# Patient Record
(demographics unavailable — no encounter records)

---

## 2025-01-15 NOTE — HISTORY OF PRESENT ILLNESS
[FreeTextEntry1] :     47yo  with Mirena IUD (17 insertion at postpartum visit) here for annual Gyn exam as well as removal/replacement of IUD. Had had monthly light periods for several years after new Mirena but has been mostly  amenorrheic over past year. Last "period" was in August. Pt has c/o irritable bladder- feels that she has to urinate all the time but bladder is not full. Has no pain or burning with urination. Has h/o ASCUS/+HPV Pap in 2019 but last Pap 8/10/23 was ASCUS/HPV Neg. Is currently taking Doxycycline and Flagyl for H. Pylori. Her HgA1C is 6 and she has been diagnosed with fatty liver as well.  OB History Total pregnancies 7. Total living children 3. Miscarriage(s) 3. (s) 1. Pregnancy 1: , TEP. Pregnancy 2: 2007, 13W, spontaneous , dilatation and curettage (D&C). Pregnancy 3 10/2007, 7W, spontaneous , dilatation and curettage (D&C). Pregnancy 4: 2008, 7W, spontaneous , dilatation and curettage (D&C). Pregnancy 5: 2009, normal spontaneous vaginal delivery (), 27W, 2lb 2oz, Female "Ana", Delivered @ [facility], Dr. Bradley . Pregnancy 6 2010, normal spontaneous vaginal delivery, FT, female "Nirali". Pregnancy 7 2017, normal spontaneous vaginal delivery (), 40W, Male "Everardo", .  [Mammogramdate] : 12/27/24 BIRADS 0,1/15/23,  [TextBox_19] : BIRADS 0 -> BIRADS 2 on diagnostic [BreastSonogramDate] : 12/27/24, 1/15/25 [TextBox_25] : BIRADS 2 [PapSmeardate] : 8/10/23 [TextBox_31] : ASCUS/HPV Neg +BV [ColonoscopyDate] : 12/2024 [TextBox_43] : Dr. Harrison

## 2025-01-15 NOTE — PROCEDURE
[IUD Placement] : intrauterine device (IUD) placement [Time out performed] : Pre-procedure time out performed.  Patient's name, date of birth and procedure confirmed. [Prevention of Pregnancy] : prevention of pregnancy [Risks] : risks [Benefits] : benefits [Alternatives] : alternatives [Patient] : patient [Neg Pregnancy Test] : negative pregnancy test [Betadine] : Betadine [Tenaculum] : Tenaculum [Easy Passage] : Easy passage [Sounded to ___ cm] : sounded to [unfilled] ~Ucm [Post Placement Transvag. US] : post placement transvaginal ultrasound [Mirena IUD] : Mirena IUD [None] : None [IUD Removal] : intrauterine device (IUD) removal [Consent Obtained] : Consent obtained [ IUD] :  IUD [Speculum Placed] : speculum placed [IUD Removed - Forceps] : IUD removed - forceps [IUD Discarded] : IUD discarded [Tolerated Well] : Patient tolerated the procedure well [No Complications] : no complications [LMPDate] : 08/2024 [de-identified] : [id number] [de-identified] : 02/2027

## 2025-01-15 NOTE — PROCEDURE
[IUD Placement] : intrauterine device (IUD) placement [Time out performed] : Pre-procedure time out performed.  Patient's name, date of birth and procedure confirmed. [Prevention of Pregnancy] : prevention of pregnancy [Risks] : risks [Benefits] : benefits [Alternatives] : alternatives [Patient] : patient [Neg Pregnancy Test] : negative pregnancy test [Betadine] : Betadine [Tenaculum] : Tenaculum [Easy Passage] : Easy passage [Sounded to ___ cm] : sounded to [unfilled] ~Ucm [Post Placement Transvag. US] : post placement transvaginal ultrasound [Mirena IUD] : Mirena IUD [None] : None [IUD Removal] : intrauterine device (IUD) removal [Consent Obtained] : Consent obtained [ IUD] :  IUD [Speculum Placed] : speculum placed [IUD Removed - Forceps] : IUD removed - forceps [IUD Discarded] : IUD discarded [Tolerated Well] : Patient tolerated the procedure well [No Complications] : no complications [LMPDate] : 08/2024 [de-identified] : [id number] [de-identified] : 02/2027

## 2025-01-15 NOTE — HISTORY OF PRESENT ILLNESS
[FreeTextEntry1] :     45yo  with Mirena IUD (17 insertion at postpartum visit) here for annual Gyn exam as well as removal/replacement of IUD. Had had monthly light periods for several years after new Mirena but has been mostly  amenorrheic over past year. Last "period" was in August. Pt has c/o irritable bladder- feels that she has to urinate all the time but bladder is not full. Has no pain or burning with urination. Has h/o ASCUS/+HPV Pap in 2019 but last Pap 8/10/23 was ASCUS/HPV Neg. Is currently taking Doxycycline and Flagyl for H. Pylori. Her HgA1C is 6 and she has been diagnosed with fatty liver as well.  OB History Total pregnancies 7. Total living children 3. Miscarriage(s) 3. (s) 1. Pregnancy 1: , TEP. Pregnancy 2: 2007, 13W, spontaneous , dilatation and curettage (D&C). Pregnancy 3 10/2007, 7W, spontaneous , dilatation and curettage (D&C). Pregnancy 4: 2008, 7W, spontaneous , dilatation and curettage (D&C). Pregnancy 5: 2009, normal spontaneous vaginal delivery (), 27W, 2lb 2oz, Female "Ana", Delivered @ [facility], Dr. Bradley . Pregnancy 6 2010, normal spontaneous vaginal delivery, FT, female "Nirali". Pregnancy 7 2017, normal spontaneous vaginal delivery (), 40W, Male "Everardo", .  [Mammogramdate] : 12/27/24 BIRADS 0,1/15/23,  [TextBox_19] : BIRADS 0 -> BIRADS 2 on diagnostic [BreastSonogramDate] : 12/27/24, 1/15/25 [TextBox_25] : BIRADS 2 [PapSmeardate] : 8/10/23 [TextBox_31] : ASCUS/HPV Neg +BV [ColonoscopyDate] : 12/2024 [TextBox_43] : Dr. Harrison